# Patient Record
Sex: MALE | Race: WHITE | NOT HISPANIC OR LATINO | URBAN - METROPOLITAN AREA
[De-identification: names, ages, dates, MRNs, and addresses within clinical notes are randomized per-mention and may not be internally consistent; named-entity substitution may affect disease eponyms.]

---

## 2017-04-24 ENCOUNTER — IMPORTED ENCOUNTER (OUTPATIENT)
Dept: URBAN - METROPOLITAN AREA CLINIC 9 | Facility: CLINIC | Age: 72
End: 2017-04-24

## 2017-06-06 ENCOUNTER — IMPORTED ENCOUNTER (OUTPATIENT)
Dept: URBAN - METROPOLITAN AREA CLINIC 9 | Facility: CLINIC | Age: 72
End: 2017-06-06

## 2017-12-05 ENCOUNTER — IMPORTED ENCOUNTER (OUTPATIENT)
Dept: URBAN - METROPOLITAN AREA CLINIC 9 | Facility: CLINIC | Age: 72
End: 2017-12-05

## 2018-06-13 ENCOUNTER — IMPORTED ENCOUNTER (OUTPATIENT)
Dept: URBAN - METROPOLITAN AREA CLINIC 9 | Facility: CLINIC | Age: 73
End: 2018-06-13

## 2018-10-26 NOTE — PATIENT DISCUSSION
RETINA IS ATTACHED OU: PVD OU; LATTICE DEGENERATION OU; NO HOLES OR TEARS SEEN ON DILATED EXAM TODAY.  RETINAL DETACHMENT SIGNS AND SYMPTOMS REVIEWED

## 2018-12-18 ENCOUNTER — IMPORTED ENCOUNTER (OUTPATIENT)
Dept: URBAN - METROPOLITAN AREA CLINIC 9 | Facility: CLINIC | Age: 73
End: 2018-12-18

## 2019-06-18 ENCOUNTER — IMPORTED ENCOUNTER (OUTPATIENT)
Dept: URBAN - METROPOLITAN AREA CLINIC 9 | Facility: CLINIC | Age: 74
End: 2019-06-18

## 2020-01-07 ENCOUNTER — IMPORTED ENCOUNTER (OUTPATIENT)
Dept: URBAN - METROPOLITAN AREA CLINIC 9 | Facility: CLINIC | Age: 75
End: 2020-01-07

## 2020-06-16 ENCOUNTER — IMPORTED ENCOUNTER (OUTPATIENT)
Dept: URBAN - METROPOLITAN AREA CLINIC 9 | Facility: CLINIC | Age: 75
End: 2020-06-16

## 2020-12-15 ENCOUNTER — IMPORTED ENCOUNTER (OUTPATIENT)
Dept: URBAN - METROPOLITAN AREA CLINIC 9 | Facility: CLINIC | Age: 75
End: 2020-12-15

## 2021-01-04 ENCOUNTER — IMPORTED ENCOUNTER (OUTPATIENT)
Dept: URBAN - METROPOLITAN AREA CLINIC 9 | Facility: CLINIC | Age: 76
End: 2021-01-04

## 2021-01-22 ENCOUNTER — IMPORTED ENCOUNTER (OUTPATIENT)
Dept: URBAN - METROPOLITAN AREA CLINIC 9 | Facility: CLINIC | Age: 76
End: 2021-01-22

## 2021-02-19 ENCOUNTER — IMPORTED ENCOUNTER (OUTPATIENT)
Dept: URBAN - METROPOLITAN AREA CLINIC 9 | Facility: CLINIC | Age: 76
End: 2021-02-19

## 2021-03-19 ENCOUNTER — IMPORTED ENCOUNTER (OUTPATIENT)
Dept: URBAN - METROPOLITAN AREA CLINIC 9 | Facility: CLINIC | Age: 76
End: 2021-03-19

## 2021-04-16 ENCOUNTER — IMPORTED ENCOUNTER (OUTPATIENT)
Dept: URBAN - METROPOLITAN AREA CLINIC 9 | Facility: CLINIC | Age: 76
End: 2021-04-16

## 2021-05-14 ENCOUNTER — IMPORTED ENCOUNTER (OUTPATIENT)
Dept: URBAN - METROPOLITAN AREA CLINIC 9 | Facility: CLINIC | Age: 76
End: 2021-05-14

## 2021-06-02 ENCOUNTER — IMPORTED ENCOUNTER (OUTPATIENT)
Dept: URBAN - METROPOLITAN AREA CLINIC 9 | Facility: CLINIC | Age: 76
End: 2021-06-02

## 2021-06-02 PROBLEM — H26.493: Noted: 2021-06-02

## 2021-06-02 PROBLEM — H43.813: Noted: 2021-06-02

## 2021-06-02 PROBLEM — H40.1331: Noted: 2021-06-02

## 2021-10-18 ASSESSMENT — TONOMETRY
OD_IOP_MMHG: 16
OD_IOP_MMHG: 14
OS_IOP_MMHG: 18
OS_IOP_MMHG: 17
OD_IOP_MMHG: 17
OS_IOP_MMHG: 18
OD_IOP_MMHG: 14
OS_IOP_MMHG: 19
OS_IOP_MMHG: 22
OS_IOP_MMHG: 13
OS_IOP_MMHG: 17
OD_IOP_MMHG: 17
OD_IOP_MMHG: 16
OD_IOP_MMHG: 16
OD_IOP_MMHG: 17
OS_IOP_MMHG: 24
OD_IOP_MMHG: 13
OD_IOP_MMHG: 16
OS_IOP_MMHG: 17
OD_IOP_MMHG: 16
OS_IOP_MMHG: 16
OD_IOP_MMHG: 15
OS_IOP_MMHG: 16
OS_IOP_MMHG: 20
OD_IOP_MMHG: 16
OS_IOP_MMHG: 19
OS_IOP_MMHG: 17
OD_IOP_MMHG: 18
OS_IOP_MMHG: 22
OD_IOP_MMHG: 16
OD_IOP_MMHG: 15
OS_IOP_MMHG: 21

## 2021-10-18 ASSESSMENT — VISUAL ACUITY
OS_CC: 20/30 + SN
OD_SC: 20/30 SN
OS_CC: 20/30 SN
OD_SC: 20/20 SN
OS_CC: 20/100 SN
OD_CC: 20/20 - SN
OS_CC: 20/30 SN
OD_CC: 20/20 SN
OD_CC: 20/25 + SN
OD_CC: 20/40 -2 SN
OD_CC: 20/20 SN
OD_CC: 20/25 SN
OS_CC: 20/30 - SN
OD_SC: 20/20 - SN
OD_CC: 20/20 SN
OS_PH: 20/60 - SN
OS_CC: 20/30 - SN
OD_CC: 20/30 SN
OS_CC: 20/30 + SN
OD_CC: 20/25 - SN
OS_CC: 20/30 SN
OD_SC: 20/25 - SN
OD_SC: 20/25 -2 SN
OS_SC: 20/200 - SN
OS_CC: 20/70 SN
OS_SC: 20/40 SN
OS_SC: CF 2' LV
OD_SC: 20/30 -2 SN
OS_CC: 20/40 + SN
OS_SC: 20/40 - SN
OS_CC: 20/200 SN
OD_SC: 20/25 SN
OD_CC: 20/30 -2 SN
OD_CC: 20/20 -2 SN
OS_CC: 20/100 - SN
OS_CC: 20/70 -2 SN
OS_SC: 20/70 - SN
OS_SC: 20/400 SN
OD_CC: 20/40 + SN
OD_CC: 20/30 SN
OS_SC: 20/30 - SN

## 2021-10-18 ASSESSMENT — PACHYMETRY
OD_CT_UM: 519.0
OS_CT_UM: 501.0

## 2021-12-13 ENCOUNTER — ESTABLISHED PATIENT (OUTPATIENT)
Dept: URBAN - METROPOLITAN AREA CLINIC 17 | Facility: CLINIC | Age: 76
End: 2021-12-13

## 2021-12-13 DIAGNOSIS — H40.1331: ICD-10-CM

## 2021-12-13 DIAGNOSIS — H26.493: ICD-10-CM

## 2021-12-13 PROCEDURE — 99213 OFFICE O/P EST LOW 20 MIN: CPT

## 2021-12-13 ASSESSMENT — VISUAL ACUITY
OS_CC: 20/400
OD_CC: 20/25-2

## 2021-12-13 ASSESSMENT — TONOMETRY
OD_IOP_MMHG: 12
OS_IOP_MMHG: 14

## 2022-06-15 ENCOUNTER — ESTABLISHED PATIENT (OUTPATIENT)
Dept: URBAN - METROPOLITAN AREA CLINIC 17 | Facility: CLINIC | Age: 77
End: 2022-06-15

## 2022-06-15 DIAGNOSIS — H40.1331: ICD-10-CM

## 2022-06-15 DIAGNOSIS — H26.493: ICD-10-CM

## 2022-06-15 PROCEDURE — 92133 CPTRZD OPH DX IMG PST SGM ON: CPT

## 2022-06-15 PROCEDURE — 99214 OFFICE O/P EST MOD 30 MIN: CPT

## 2022-06-15 ASSESSMENT — TONOMETRY
OD_IOP_MMHG: 15
OS_IOP_MMHG: 18
OS_IOP_MMHG: 20
OD_IOP_MMHG: 16

## 2022-06-15 ASSESSMENT — VISUAL ACUITY
OD_SC: 20/30-2
OU_SC: 20/30-2

## 2022-10-11 ENCOUNTER — FOLLOW UP (OUTPATIENT)
Dept: URBAN - METROPOLITAN AREA CLINIC 17 | Facility: CLINIC | Age: 77
End: 2022-10-11

## 2022-10-11 DIAGNOSIS — H40.1331: ICD-10-CM

## 2022-10-11 PROCEDURE — 99213 OFFICE O/P EST LOW 20 MIN: CPT

## 2022-10-11 ASSESSMENT — TONOMETRY
OD_IOP_MMHG: 12
OS_IOP_MMHG: 14

## 2022-10-11 ASSESSMENT — VISUAL ACUITY
OU_CC: J3
OD_CC: 20/25-2

## 2023-04-11 ENCOUNTER — ESTABLISHED PATIENT (OUTPATIENT)
Dept: URBAN - METROPOLITAN AREA CLINIC 17 | Facility: CLINIC | Age: 78
End: 2023-04-11

## 2023-04-11 DIAGNOSIS — E11.3292: ICD-10-CM

## 2023-04-11 DIAGNOSIS — H26.493: ICD-10-CM

## 2023-04-11 DIAGNOSIS — H01.021: ICD-10-CM

## 2023-04-11 DIAGNOSIS — H40.1331: ICD-10-CM

## 2023-04-11 DIAGNOSIS — H01.024: ICD-10-CM

## 2023-04-11 PROCEDURE — 92134 CPTRZ OPH DX IMG PST SGM RTA: CPT

## 2023-04-11 PROCEDURE — 99214 OFFICE O/P EST MOD 30 MIN: CPT

## 2023-04-11 ASSESSMENT — TONOMETRY
OD_IOP_MMHG: 20
OS_IOP_MMHG: 23

## 2023-04-11 ASSESSMENT — VISUAL ACUITY: OD_SC: 20/25+1

## 2023-06-21 ENCOUNTER — FOLLOW UP (OUTPATIENT)
Dept: URBAN - METROPOLITAN AREA CLINIC 17 | Facility: CLINIC | Age: 78
End: 2023-06-21

## 2023-06-21 DIAGNOSIS — H40.89: ICD-10-CM

## 2023-06-21 DIAGNOSIS — H43.813: ICD-10-CM

## 2023-06-21 DIAGNOSIS — H01.024: ICD-10-CM

## 2023-06-21 DIAGNOSIS — H40.1331: ICD-10-CM

## 2023-06-21 DIAGNOSIS — E11.3292: ICD-10-CM

## 2023-06-21 DIAGNOSIS — H26.493: ICD-10-CM

## 2023-06-21 DIAGNOSIS — H01.021: ICD-10-CM

## 2023-06-21 PROCEDURE — 92133 CPTRZD OPH DX IMG PST SGM ON: CPT

## 2023-06-21 PROCEDURE — 99213 OFFICE O/P EST LOW 20 MIN: CPT

## 2023-06-21 PROCEDURE — 76514 ECHO EXAM OF EYE THICKNESS: CPT

## 2023-06-21 ASSESSMENT — PACHYMETRY
OS_CT_UM: 545
OD_CT_UM: 536

## 2023-06-21 ASSESSMENT — VISUAL ACUITY
OU_SC: 20/20
OD_GLARE: 20/25
OU_GLARE: 20/25
OD_SC: 20/20

## 2023-06-21 ASSESSMENT — TONOMETRY
OS_IOP_MMHG: 30
OD_IOP_MMHG: 15

## 2023-09-20 ENCOUNTER — FOLLOW UP (OUTPATIENT)
Dept: URBAN - METROPOLITAN AREA CLINIC 17 | Facility: CLINIC | Age: 78
End: 2023-09-20

## 2023-09-20 DIAGNOSIS — H40.89: ICD-10-CM

## 2023-09-20 DIAGNOSIS — H40.1331: ICD-10-CM

## 2023-09-20 PROCEDURE — 99213 OFFICE O/P EST LOW 20 MIN: CPT

## 2023-09-20 ASSESSMENT — TONOMETRY
OS_IOP_MMHG: 24
OD_IOP_MMHG: 17
OD_IOP_MMHG: 16
OS_IOP_MMHG: 22

## 2023-09-20 ASSESSMENT — VISUAL ACUITY: OD_SC: 20/20-1

## 2023-10-27 ENCOUNTER — ESTABLISHED PATIENT (OUTPATIENT)
Dept: URBAN - METROPOLITAN AREA CLINIC 17 | Facility: CLINIC | Age: 78
End: 2023-10-27

## 2023-10-27 DIAGNOSIS — H40.89: ICD-10-CM

## 2023-10-27 DIAGNOSIS — H43.813: ICD-10-CM

## 2023-10-27 DIAGNOSIS — H34.8121: ICD-10-CM

## 2023-10-27 PROCEDURE — 92134 CPTRZ OPH DX IMG PST SGM RTA: CPT

## 2023-10-27 PROCEDURE — 99214 OFFICE O/P EST MOD 30 MIN: CPT

## 2023-10-27 ASSESSMENT — TONOMETRY
OS_IOP_MMHG: 26
OD_IOP_MMHG: 10

## 2023-10-27 ASSESSMENT — VISUAL ACUITY
OD_SC: 20/20-2
OU_SC: 20/20-2

## 2024-03-25 ENCOUNTER — ESTABLISHED PATIENT (OUTPATIENT)
Dept: URBAN - METROPOLITAN AREA CLINIC 17 | Facility: CLINIC | Age: 79
End: 2024-03-25

## 2024-03-25 DIAGNOSIS — E11.9: ICD-10-CM

## 2024-03-25 DIAGNOSIS — H40.1331: ICD-10-CM

## 2024-03-25 DIAGNOSIS — H40.89: ICD-10-CM

## 2024-03-25 DIAGNOSIS — H26.493: ICD-10-CM

## 2024-03-25 PROCEDURE — 92133 CPTRZD OPH DX IMG PST SGM ON: CPT

## 2024-03-25 PROCEDURE — 92015 DETERMINE REFRACTIVE STATE: CPT

## 2024-03-25 PROCEDURE — 99214 OFFICE O/P EST MOD 30 MIN: CPT

## 2024-03-25 ASSESSMENT — TONOMETRY
OD_IOP_MMHG: 14
OS_IOP_MMHG: 40

## 2024-03-25 ASSESSMENT — VISUAL ACUITY: OD_SC: 20/25+2

## 2024-09-18 ENCOUNTER — FOLLOW UP (OUTPATIENT)
Dept: URBAN - METROPOLITAN AREA CLINIC 17 | Facility: CLINIC | Age: 79
End: 2024-09-18

## 2024-09-18 DIAGNOSIS — H40.89: ICD-10-CM

## 2024-09-18 DIAGNOSIS — H40.1331: ICD-10-CM

## 2024-09-18 DIAGNOSIS — H26.493: ICD-10-CM

## 2024-09-18 PROCEDURE — 99213 OFFICE O/P EST LOW 20 MIN: CPT

## 2025-03-18 ENCOUNTER — COMPREHENSIVE EXAM (OUTPATIENT)
Age: 80
End: 2025-03-18

## 2025-03-18 DIAGNOSIS — H26.493: ICD-10-CM

## 2025-03-18 DIAGNOSIS — H34.8121: ICD-10-CM

## 2025-03-18 DIAGNOSIS — E11.9: ICD-10-CM

## 2025-03-18 DIAGNOSIS — H40.1331: ICD-10-CM

## 2025-03-18 DIAGNOSIS — H40.89: ICD-10-CM

## 2025-03-18 PROCEDURE — 92134 CPTRZ OPH DX IMG PST SGM RTA: CPT | Mod: NC

## 2025-03-18 PROCEDURE — 99214 OFFICE O/P EST MOD 30 MIN: CPT

## 2025-03-18 PROCEDURE — 92133 CPTRZD OPH DX IMG PST SGM ON: CPT
